# Patient Record
Sex: FEMALE | Race: BLACK OR AFRICAN AMERICAN | NOT HISPANIC OR LATINO
[De-identification: names, ages, dates, MRNs, and addresses within clinical notes are randomized per-mention and may not be internally consistent; named-entity substitution may affect disease eponyms.]

---

## 2022-02-07 ENCOUNTER — APPOINTMENT (OUTPATIENT)
Dept: HEART AND VASCULAR | Facility: CLINIC | Age: 52
End: 2022-02-07
Payer: COMMERCIAL

## 2022-02-07 ENCOUNTER — NON-APPOINTMENT (OUTPATIENT)
Age: 52
End: 2022-02-07

## 2022-02-07 VITALS
OXYGEN SATURATION: 96 % | TEMPERATURE: 97.7 F | WEIGHT: 186 LBS | SYSTOLIC BLOOD PRESSURE: 122 MMHG | HEART RATE: 101 BPM | HEIGHT: 62 IN | DIASTOLIC BLOOD PRESSURE: 85 MMHG | BODY MASS INDEX: 34.23 KG/M2

## 2022-02-07 VITALS — SYSTOLIC BLOOD PRESSURE: 121 MMHG | DIASTOLIC BLOOD PRESSURE: 84 MMHG

## 2022-02-07 DIAGNOSIS — Z78.9 OTHER SPECIFIED HEALTH STATUS: ICD-10-CM

## 2022-02-07 PROBLEM — Z00.00 ENCOUNTER FOR PREVENTIVE HEALTH EXAMINATION: Status: ACTIVE | Noted: 2022-02-07

## 2022-02-07 PROCEDURE — 36415 COLL VENOUS BLD VENIPUNCTURE: CPT

## 2022-02-07 PROCEDURE — 99205 OFFICE O/P NEW HI 60 MIN: CPT | Mod: 25

## 2022-02-07 PROCEDURE — 93000 ELECTROCARDIOGRAM COMPLETE: CPT

## 2022-02-07 NOTE — DISCUSSION/SUMMARY
[Patient] : the patient [___ Month(s)] : in [unfilled] month(s) [FreeTextEntry1] : \par 50 y/o female with h/o obesity, hl who presents for initial evaluation today.\par \par -ordered ekg today - ST, normal intervals, no st/t changes\par -ordered calcium score\par -ordered labs today\par -counseled on cvd risk factors\par -f/up 3 months for lipids, cvd risk factors\par \par I have spent 60 minutes reviewing labs, records, tests and discussing cvd risk factors, lipids

## 2022-02-07 NOTE — HISTORY OF PRESENT ILLNESS
[FreeTextEntry1] : \par 52 y/o female with h/o obesity, hl who presents for initial evaluation today.\par \par \par no cp, sob, palpitations, syncope, lh, edema, orthopnea, pnd\par notes some shoulder pain sharp seconds\par \par not actively exercising\par \par \par \par PMH/PSH:\par s/p appy \par hl\par foot surgery\par obesity\par \par ALL:\par nkda\par \par \par MEDS:\par mvi\par \par \par SH:\par no tobacco/drugs\par social etoh\par loan \par from [place]\par engaged\par no children\par \par \par \par FH:\par father - h/o cva,  72\par mother -  cervical cancer 38\par 2 brothers - alive

## 2022-02-09 LAB
ALBUMIN SERPL ELPH-MCNC: 4.5 G/DL
ALP BLD-CCNC: 91 U/L
ALT SERPL-CCNC: 17 U/L
ANION GAP SERPL CALC-SCNC: 12 MMOL/L
APPEARANCE: CLEAR
AST SERPL-CCNC: 20 U/L
BACTERIA: NEGATIVE
BASOPHILS # BLD AUTO: 0.03 K/UL
BASOPHILS NFR BLD AUTO: 0.5 %
BILIRUB SERPL-MCNC: 0.6 MG/DL
BILIRUBIN URINE: NEGATIVE
BLOOD URINE: NEGATIVE
BUN SERPL-MCNC: 19 MG/DL
CALCIUM SERPL-MCNC: 9.7 MG/DL
CHLORIDE SERPL-SCNC: 106 MMOL/L
CHOLEST SERPL-MCNC: 222 MG/DL
CO2 SERPL-SCNC: 24 MMOL/L
COLOR: YELLOW
CREAT SERPL-MCNC: 0.76 MG/DL
EOSINOPHIL # BLD AUTO: 0.02 K/UL
EOSINOPHIL NFR BLD AUTO: 0.3 %
ESTIMATED AVERAGE GLUCOSE: 117 MG/DL
GLUCOSE QUALITATIVE U: NEGATIVE
GLUCOSE SERPL-MCNC: 81 MG/DL
HBA1C MFR BLD HPLC: 5.7 %
HCT VFR BLD CALC: 39.6 %
HDLC SERPL-MCNC: 81 MG/DL
HGB BLD-MCNC: 12.3 G/DL
HYALINE CASTS: 1 /LPF
IMM GRANULOCYTES NFR BLD AUTO: 0.3 %
KETONES URINE: NEGATIVE
LDLC SERPL CALC-MCNC: 129 MG/DL
LEUKOCYTE ESTERASE URINE: NEGATIVE
LYMPHOCYTES # BLD AUTO: 1.56 K/UL
LYMPHOCYTES NFR BLD AUTO: 25.2 %
MAGNESIUM SERPL-MCNC: 1.9 MG/DL
MAN DIFF?: NORMAL
MCHC RBC-ENTMCNC: 27 PG
MCHC RBC-ENTMCNC: 31.1 GM/DL
MCV RBC AUTO: 86.8 FL
MICROSCOPIC-UA: NORMAL
MONOCYTES # BLD AUTO: 0.51 K/UL
MONOCYTES NFR BLD AUTO: 8.2 %
NEUTROPHILS # BLD AUTO: 4.05 K/UL
NEUTROPHILS NFR BLD AUTO: 65.5 %
NITRITE URINE: NEGATIVE
NONHDLC SERPL-MCNC: 141 MG/DL
PH URINE: 5.5
PLATELET # BLD AUTO: 253 K/UL
POTASSIUM SERPL-SCNC: 4.3 MMOL/L
PROT SERPL-MCNC: 7 G/DL
PROTEIN URINE: NORMAL
RBC # BLD: 4.56 M/UL
RBC # FLD: 14 %
RED BLOOD CELLS URINE: 1 /HPF
SODIUM SERPL-SCNC: 142 MMOL/L
SPECIFIC GRAVITY URINE: 1.03
SQUAMOUS EPITHELIAL CELLS: 2 /HPF
TRIGL SERPL-MCNC: 58 MG/DL
TSH SERPL-ACNC: 0.57 UIU/ML
UROBILINOGEN URINE: NORMAL
WBC # FLD AUTO: 6.19 K/UL
WHITE BLOOD CELLS URINE: 2 /HPF

## 2022-02-22 ENCOUNTER — RESULT REVIEW (OUTPATIENT)
Age: 52
End: 2022-02-22

## 2022-02-22 ENCOUNTER — OUTPATIENT (OUTPATIENT)
Dept: OUTPATIENT SERVICES | Facility: HOSPITAL | Age: 52
LOS: 1 days | End: 2022-02-22

## 2022-02-22 ENCOUNTER — APPOINTMENT (OUTPATIENT)
Dept: CT IMAGING | Facility: CLINIC | Age: 52
End: 2022-02-22
Payer: SELF-PAY

## 2022-02-22 PROCEDURE — 75571 CT HRT W/O DYE W/CA TEST: CPT | Mod: 26

## 2022-03-28 ENCOUNTER — APPOINTMENT (OUTPATIENT)
Dept: HEART AND VASCULAR | Facility: CLINIC | Age: 52
End: 2022-03-28
Payer: COMMERCIAL

## 2022-03-28 VITALS
HEART RATE: 82 BPM | WEIGHT: 186 LBS | SYSTOLIC BLOOD PRESSURE: 122 MMHG | HEIGHT: 62 IN | OXYGEN SATURATION: 97 % | TEMPERATURE: 96.2 F | DIASTOLIC BLOOD PRESSURE: 86 MMHG | BODY MASS INDEX: 34.23 KG/M2

## 2022-03-28 VITALS — DIASTOLIC BLOOD PRESSURE: 62 MMHG | SYSTOLIC BLOOD PRESSURE: 116 MMHG

## 2022-03-28 DIAGNOSIS — R73.03 PREDIABETES.: ICD-10-CM

## 2022-03-28 DIAGNOSIS — R07.9 CHEST PAIN, UNSPECIFIED: ICD-10-CM

## 2022-03-28 PROCEDURE — 99214 OFFICE O/P EST MOD 30 MIN: CPT | Mod: 25

## 2022-03-28 PROCEDURE — 93000 ELECTROCARDIOGRAM COMPLETE: CPT

## 2022-03-28 NOTE — HISTORY OF PRESENT ILLNESS
[FreeTextEntry1] : 53 y/o female with h/o obesity, hl, predm, pad who presents for f/up today\par \par Last visit \par notes some new cp, left arm pain/shoulder several times last week\par \par \par Calcium score -  - zero, atheromatous disease of aorta\par \par no sob, palpitations, syncope, lh, edema, orthopnea, pnd\par \par \par not actively exercising\par \par \par \par PMH/PSH:\par s/p appy \par hl\par foot surgery\par obesity\par predm\par pad\par \par ALL:\par nkda\par \par \par MEDS:\par mvi\par \par \par SH:\par no tobacco/drugs\par social etoh\par loan \par from [place]\par engaged\par no children\par \par \par \par FH:\par father - h/o cva,  72\par mother -  cervical cancer 38\par 2 brothers - alive \par

## 2022-03-28 NOTE — DISCUSSION/SUMMARY
[Patient] : the patient [___ Month(s)] : in [unfilled] month(s) [FreeTextEntry1] : 51 y/o female with h/o obesity, hl, predm, pad who presents for f/up today\par \par -ekg ordered today - nsr, normal intervals, no st/t changes\par -ekg 2/22 - ST, normal intervals, no st/t changes\par -calcium score 2022 - zero, atheromatous disease of aorta\par -labs 2022 reviewed\par -ordered CTA for cp, left shoulder pain\par -ordered Echo for cp, shoulder pain\par -consider asa, statin given pad and pending CTA - will discuss\par -close monitoring of A1c for predm\par -counseled on cvd risk factors\par -f/up 2-3 months for cvd risk factors, cp evaluation\par \par I have spent 30 minutes reviewing labs, records, tests and discussing cvd risk factors, cp evaluation

## 2022-04-12 ENCOUNTER — APPOINTMENT (OUTPATIENT)
Dept: CT IMAGING | Facility: CLINIC | Age: 52
End: 2022-04-12
Payer: COMMERCIAL

## 2022-04-12 ENCOUNTER — OUTPATIENT (OUTPATIENT)
Dept: OUTPATIENT SERVICES | Facility: HOSPITAL | Age: 52
LOS: 1 days | End: 2022-04-12

## 2022-04-12 ENCOUNTER — RESULT REVIEW (OUTPATIENT)
Age: 52
End: 2022-04-12

## 2022-04-12 PROCEDURE — 75574 CT ANGIO HRT W/3D IMAGE: CPT | Mod: 26

## 2022-05-06 ENCOUNTER — APPOINTMENT (OUTPATIENT)
Dept: HEART AND VASCULAR | Facility: CLINIC | Age: 52
End: 2022-05-06
Payer: COMMERCIAL

## 2022-05-06 VITALS
OXYGEN SATURATION: 97 % | DIASTOLIC BLOOD PRESSURE: 60 MMHG | BODY MASS INDEX: 32.57 KG/M2 | HEART RATE: 79 BPM | TEMPERATURE: 97.8 F | HEIGHT: 62 IN | WEIGHT: 177 LBS | SYSTOLIC BLOOD PRESSURE: 100 MMHG

## 2022-05-06 PROCEDURE — 93306 TTE W/DOPPLER COMPLETE: CPT

## 2022-06-10 ENCOUNTER — TRANSCRIPTION ENCOUNTER (OUTPATIENT)
Age: 52
End: 2022-06-10

## 2022-06-10 VITALS
OXYGEN SATURATION: 97 % | DIASTOLIC BLOOD PRESSURE: 66 MMHG | TEMPERATURE: 98 F | RESPIRATION RATE: 18 BRPM | WEIGHT: 179.9 LBS | HEIGHT: 62 IN | HEART RATE: 67 BPM | SYSTOLIC BLOOD PRESSURE: 102 MMHG

## 2022-06-10 RX ORDER — CHOLECALCIFEROL (VITAMIN D3) 125 MCG
1 CAPSULE ORAL
Qty: 0 | Refills: 0 | DISCHARGE

## 2022-06-10 NOTE — ASU PATIENT PROFILE, ADULT - NSICDXPASTSURGICALHX_GEN_ALL_CORE_FT
PAST SURGICAL HISTORY:  History of appendectomy      PAST SURGICAL HISTORY:  History of appendectomy     History of bunionectomy

## 2022-06-11 ENCOUNTER — OUTPATIENT (OUTPATIENT)
Dept: OUTPATIENT SERVICES | Facility: HOSPITAL | Age: 52
LOS: 1 days | Discharge: HOME | End: 2022-06-11
Payer: COMMERCIAL

## 2022-06-11 ENCOUNTER — RESULT REVIEW (OUTPATIENT)
Age: 52
End: 2022-06-11

## 2022-06-11 ENCOUNTER — TRANSCRIPTION ENCOUNTER (OUTPATIENT)
Age: 52
End: 2022-06-11

## 2022-06-11 VITALS
SYSTOLIC BLOOD PRESSURE: 102 MMHG | RESPIRATION RATE: 18 BRPM | OXYGEN SATURATION: 98 % | TEMPERATURE: 98 F | HEART RATE: 61 BPM | DIASTOLIC BLOOD PRESSURE: 76 MMHG

## 2022-06-11 DIAGNOSIS — Z98.890 OTHER SPECIFIED POSTPROCEDURAL STATES: Chronic | ICD-10-CM

## 2022-06-11 DIAGNOSIS — Z90.49 ACQUIRED ABSENCE OF OTHER SPECIFIED PARTS OF DIGESTIVE TRACT: Chronic | ICD-10-CM

## 2022-06-11 LAB
BLD GP AB SCN SERPL QL: NEGATIVE — SIGNIFICANT CHANGE UP
RH IG SCN BLD-IMP: POSITIVE — SIGNIFICANT CHANGE UP

## 2022-06-11 PROCEDURE — 86900 BLOOD TYPING SEROLOGIC ABO: CPT

## 2022-06-11 PROCEDURE — 88305 TISSUE EXAM BY PATHOLOGIST: CPT | Mod: 26

## 2022-06-11 PROCEDURE — 58558 HYSTEROSCOPY BIOPSY: CPT

## 2022-06-11 PROCEDURE — 86850 RBC ANTIBODY SCREEN: CPT

## 2022-06-11 PROCEDURE — 88305 TISSUE EXAM BY PATHOLOGIST: CPT

## 2022-06-11 PROCEDURE — 86901 BLOOD TYPING SEROLOGIC RH(D): CPT

## 2022-06-11 DEVICE — MYOSURE TISSUE REMOVAL DEVICE XL: Type: IMPLANTABLE DEVICE | Status: FUNCTIONAL

## 2022-06-11 DEVICE — MYOSURE TISSUE REMOVAL DEVICE REACH: Type: IMPLANTABLE DEVICE | Status: FUNCTIONAL

## 2022-06-11 RX ORDER — ACETAMINOPHEN 500 MG
1000 TABLET ORAL EVERY 6 HOURS
Refills: 0 | Status: DISCONTINUED | OUTPATIENT
Start: 2022-06-11 | End: 2022-06-11

## 2022-06-11 RX ORDER — ONDANSETRON 8 MG/1
8 TABLET, FILM COATED ORAL EVERY 8 HOURS
Refills: 0 | Status: DISCONTINUED | OUTPATIENT
Start: 2022-06-11 | End: 2022-06-11

## 2022-06-11 RX ORDER — ACETAMINOPHEN 500 MG
2 TABLET ORAL
Qty: 0 | Refills: 0 | DISCHARGE
Start: 2022-06-11

## 2022-06-11 RX ORDER — SODIUM CHLORIDE 9 MG/ML
1000 INJECTION, SOLUTION INTRAVENOUS
Refills: 0 | Status: DISCONTINUED | OUTPATIENT
Start: 2022-06-11 | End: 2022-06-11

## 2022-06-11 RX ORDER — SIMETHICONE 80 MG/1
80 TABLET, CHEWABLE ORAL EVERY 6 HOURS
Refills: 0 | Status: DISCONTINUED | OUTPATIENT
Start: 2022-06-11 | End: 2022-06-11

## 2022-06-11 NOTE — ASU DISCHARGE PLAN (ADULT/PEDIATRIC) - NS MD DC FALL RISK RISK
For information on Fall & Injury Prevention, visit: [email or web address] OR  [email or web address] OR  https://www.cdc.gov/steadi/patient.html

## 2022-06-11 NOTE — ASU DISCHARGE PLAN (ADULT/PEDIATRIC) - WORK/SCHOOL DURATION DAY(S)
Hpi Title: Evaluation of Skin Lesions
How Severe Are Your Spot(S)?: mild
Have Your Spot(S) Been Treated In The Past?: has not been treated
Location: Left superior cheek
2 bdays

## 2022-06-11 NOTE — ASU DISCHARGE PLAN (ADULT/PEDIATRIC) - CARE PROVIDER_API CALL
Iva Raymond)  Obstetrics and Gynecology  [address], [address]  [place], [place] [address]  Phone: [phone]  Fax: [phone]  Follow Up Time:

## 2022-06-11 NOTE — PACU DISCHARGE NOTE - COMMENTS
PACU and discharge criteria met. See flowsheet. Discharge instructions given to patient. Patient verbalized and demonstrated understanding.   D/C home at 1230 hrs. accompanied by Derik allan. Patient seen by Dr. Raymond in PACU before d/c home. Patient escorted to lobby via wheelchair by PCA.

## 2022-06-14 LAB — SURGICAL PATHOLOGY STUDY: SIGNIFICANT CHANGE UP

## 2022-11-21 ENCOUNTER — TRANSCRIPTION ENCOUNTER (OUTPATIENT)
Age: 52
End: 2022-11-21

## 2022-12-01 NOTE — ASU PATIENT PROFILE, ADULT - PAIN SCALE PREFERRED, PROFILE
12/1/2022 1:47 PM    Ms. Jailene Peña  [address] [address]        Hi Ms Chester Cheatham,    Advance Care Planning (ACP) is thinking about health care choices you may face in the future and helping others know ahead of time what your choices are. Planning is important for adults at any age or stage of health. The goal of ACP is to help ensure that you get medical care that is consistent with your values, goals and care preferences. Have you talked with your provider about advance care planning? Have you already placed your healthcare wishes in writing? Lets make sure your chart has your up-to-date care preferences! Ask yourself:   Who do I trust to make medical decisions for me if I cant make them myself?  If I had a sudden illness or accident, how much have I told my loved ones and doctors about how I would want to be cared for? Have I told them enough? While most people think advance care planning is important, they have a hard time knowing where to start. Your health care provider can help! Discuss ACP at your upcoming appointment. Also, you can update your information on your personalized health page in [address] at any time by clicking     [email or web address]]. Dont delay in taking this important step to discuss your choices with your health care provider. At [address], we believe your health care should always revolve around you. If you are still having issue with your error please reach out to [address] desk.       Sincerely,     Tammi FAJARDO, RN, [facility]   Care Transition Nurse  [phone]        Sincerely,      Wicho Zavala RN
12/1/2022 1:52 PM    [address] [address]          [facility],     Advance Care Planning (ACP) is thinking about health care choices you may face in the future and helping others know ahead of time what your choices are. Planning is important for adults at any age or stage of health. The goal of ACP is to help ensure that you get medical care that is consistent with your values, goals and care preferences. Have you talked with your provider about advance care planning? Have you already placed your healthcare wishes in writing? Lets make sure your chart has your up-to-date care preferences! Ask yourself:   Who do I trust to make medical decisions for me if I cant make them myself?  If I had a sudden illness or accident, how much have I told my loved ones and doctors about how I would want to be cared for? Have I told them enough? While most people think advance care planning is important, they have a hard time knowing where to start. Your health care provider can help! Discuss ACP at your upcoming appointment. Also, you can update your information on your personalized health page in [address] at any time by clicking     [email or web address]]. Dont delay in taking this important step to discuss your choices with your health care provider. At [facility], we believe your health care should always revolve around you. If you are still having issues with an error please reach out to [employer] help desk.     Sincerely,     Lonnie FAJARDO, RN, [facility]   Care Transition Nurse  [phone]          Sincerely,      David Browne, RN
numerical 0-10

## 2023-10-03 ENCOUNTER — APPOINTMENT (OUTPATIENT)
Dept: HEART AND VASCULAR | Facility: CLINIC | Age: 53
End: 2023-10-03
Payer: COMMERCIAL

## 2023-10-03 ENCOUNTER — NON-APPOINTMENT (OUTPATIENT)
Age: 53
End: 2023-10-03

## 2023-10-03 VITALS
SYSTOLIC BLOOD PRESSURE: 101 MMHG | HEIGHT: 62 IN | DIASTOLIC BLOOD PRESSURE: 73 MMHG | BODY MASS INDEX: 34.41 KG/M2 | OXYGEN SATURATION: 98 % | TEMPERATURE: 97.4 F | HEART RATE: 70 BPM | WEIGHT: 187 LBS

## 2023-10-03 PROBLEM — N84.0 POLYP OF CORPUS UTERI: Chronic | Status: ACTIVE | Noted: 2022-06-10

## 2023-10-03 PROCEDURE — 93000 ELECTROCARDIOGRAM COMPLETE: CPT

## 2023-10-03 PROCEDURE — 36415 COLL VENOUS BLD VENIPUNCTURE: CPT

## 2023-10-03 PROCEDURE — 99214 OFFICE O/P EST MOD 30 MIN: CPT | Mod: 25

## 2023-10-04 LAB
ALBUMIN SERPL ELPH-MCNC: 4.4 G/DL
ALP BLD-CCNC: 88 U/L
ALT SERPL-CCNC: 29 U/L
ANION GAP SERPL CALC-SCNC: 13 MMOL/L
APPEARANCE: CLEAR
AST SERPL-CCNC: 26 U/L
BACTERIA: NEGATIVE /HPF
BASOPHILS # BLD AUTO: 0.02 K/UL
BASOPHILS NFR BLD AUTO: 0.4 %
BILIRUB SERPL-MCNC: 0.4 MG/DL
BILIRUBIN URINE: NEGATIVE
BLOOD URINE: NEGATIVE
BUN SERPL-MCNC: 16 MG/DL
CALCIUM SERPL-MCNC: 9.7 MG/DL
CAST: 0 /LPF
CHLORIDE SERPL-SCNC: 103 MMOL/L
CHOLEST SERPL-MCNC: 213 MG/DL
CO2 SERPL-SCNC: 24 MMOL/L
COLOR: YELLOW
CREAT SERPL-MCNC: 0.65 MG/DL
EGFR: 105 ML/MIN/1.73M2
EOSINOPHIL # BLD AUTO: 0.04 K/UL
EOSINOPHIL NFR BLD AUTO: 0.8 %
EPITHELIAL CELLS: 11 /HPF
ESTIMATED AVERAGE GLUCOSE: 117 MG/DL
GLUCOSE QUALITATIVE U: NEGATIVE MG/DL
GLUCOSE SERPL-MCNC: 79 MG/DL
HBA1C MFR BLD HPLC: 5.7 %
HCT VFR BLD CALC: 39.3 %
HDLC SERPL-MCNC: 69 MG/DL
HGB BLD-MCNC: 12.1 G/DL
IMM GRANULOCYTES NFR BLD AUTO: 0.2 %
KETONES URINE: NEGATIVE MG/DL
LDLC SERPL CALC-MCNC: 129 MG/DL
LEUKOCYTE ESTERASE URINE: NEGATIVE
LYMPHOCYTES # BLD AUTO: 1.61 K/UL
LYMPHOCYTES NFR BLD AUTO: 30.7 %
MAGNESIUM SERPL-MCNC: 1.8 MG/DL
MAN DIFF?: NORMAL
MCHC RBC-ENTMCNC: 26.9 PG
MCHC RBC-ENTMCNC: 30.8 GM/DL
MCV RBC AUTO: 87.5 FL
MICROSCOPIC-UA: NORMAL
MONOCYTES # BLD AUTO: 0.48 K/UL
MONOCYTES NFR BLD AUTO: 9.1 %
NEUTROPHILS # BLD AUTO: 3.09 K/UL
NEUTROPHILS NFR BLD AUTO: 58.8 %
NITRITE URINE: NEGATIVE
NONHDLC SERPL-MCNC: 145 MG/DL
PH URINE: 6
PLATELET # BLD AUTO: 230 K/UL
POTASSIUM SERPL-SCNC: 4.4 MMOL/L
PROT SERPL-MCNC: 6.7 G/DL
PROTEIN URINE: NEGATIVE MG/DL
RBC # BLD: 4.49 M/UL
RBC # FLD: 14.6 %
RED BLOOD CELLS URINE: 1 /HPF
SODIUM SERPL-SCNC: 139 MMOL/L
SPECIFIC GRAVITY URINE: 1.02
TRIGL SERPL-MCNC: 89 MG/DL
TSH SERPL-ACNC: 0.46 UIU/ML
UROBILINOGEN URINE: 0.2 MG/DL
WBC # FLD AUTO: 5.25 K/UL
WHITE BLOOD CELLS URINE: 0 /HPF

## 2023-10-06 LAB — APO LP(A) SERPL-MCNC: 57.8 NMOL/L

## 2023-12-20 ENCOUNTER — OUTPATIENT (OUTPATIENT)
Dept: OUTPATIENT SERVICES | Facility: HOSPITAL | Age: 53
LOS: 1 days | End: 2023-12-20
Payer: COMMERCIAL

## 2023-12-20 DIAGNOSIS — Z98.890 OTHER SPECIFIED POSTPROCEDURAL STATES: Chronic | ICD-10-CM

## 2023-12-20 DIAGNOSIS — R07.9 CHEST PAIN, UNSPECIFIED: ICD-10-CM

## 2023-12-20 DIAGNOSIS — Z90.49 ACQUIRED ABSENCE OF OTHER SPECIFIED PARTS OF DIGESTIVE TRACT: Chronic | ICD-10-CM

## 2023-12-20 PROCEDURE — 93306 TTE W/DOPPLER COMPLETE: CPT | Mod: 26

## 2023-12-20 PROCEDURE — 93306 TTE W/DOPPLER COMPLETE: CPT

## 2023-12-20 PROCEDURE — 76376 3D RENDER W/INTRP POSTPROCES: CPT | Mod: 26

## 2024-01-02 NOTE — ASU PREOP CHECKLIST - ISOLATION PRECAUTIONS
- continue statin   - lipid panel pending      Discharge Instructions for:  Flex Sig / Colonoscopy  Activity  Due to the medication you received during your Flexible Sigmoidoscopy or Colonoscopy, do not drive a motor vehicle, operate machinery or appliances, make important decisions, sign legal documents, or drink alcohol for 24 hours.    Because of the medication you received, you may or may not remember your procedure or the doctor’s explanation of what your examination showed.    What to Expect  You may feel abdominal fullness and cramping.  This is normal and is caused by air used during the procedure to inflate the colon. This can be relieved with walking around, lying on your left side with knees bent and passing gas.  Do not use laxatives or enemas for one week following your procedure.        - If you were taking any previously you may resume them when you feel necessary; however, you may not need to due to the prep for the colonoscopy.  You may notice a small amount of blood on your toilet paper, a little red is okay and can discolor the toilet bowl to a pink/light-reddish color.  If you have more than 1 or 2 times of chunks or clots of blood in the toilet, call the office.  It may take a few days for you to return to your normal bowel habits.    Diet  Resume your regular diet. Follow the high fiber diet instructions. If you are  nauseated or have abdominal pain:  continue with clear liquids and progress slowly to your regular diet.    Managing nausea     Some people have an upset stomach after procedures. This is often because of anesthesia, pain, or pain medicine, or the stress of surgery. These tips will help you handle nausea and eat healthy foods as you get better. If you were on a special food plan before surgery, ask your doctor if you should follow it while you get better. These tips may help:  Do not push yourself to eat. Your body will tell you when to eat and how much.  Start off with clear liquids and soup. They are easier to digest.  Next  try semi-solid foods, such as mashed potatoes, applesauce, and gelatin, as you feel ready.  Slowly move to solid foods. Don’t eat fatty, rich, or spicy foods at first.  Do not force yourself to have 3 large meals a day. Instead eat smaller amounts more often.  Take pain medicines with a small amount of solid food, such as crackers or toast, to avoid nausea.  Special Instructions     During your procedure you had:    [] Biopsy(ies) taken.    [] Polyp(s) removed.    [x] No tissue removed. Follow up as needed.     If you had specimens removed, they have been sent to Pathology. The specimen results will be:     [] Called to you within the next week.    [] Discussed with you at your post op visit.     Call your doctor if any of the following problems occur.  Dr. Roberson's office number is: [phone]:   Severe pain in abdomen.  Ongoing or worsening chest pain or shortness of breath  Excessive nausea and/or vomiting  Abdominal bloating not relieved by passing gas or inability to pass gas  New or increased bleeding from rectum.  Black tarry stools  Temperature of 101.?F  or above     You can greatly reduce your risk of colon polyps and colorectal cancer by:           1. Having regular screenings.          2. Eating fruits, vegetables, and whole grains.          3. Reducing your fat intake.           4. Limiting your alcohol consumption.           5. Not using tobacco.            6. Staying physically active and maintaining a healthy body weight.           7. Reducing red meat consumption.          HIGH FIBER DIET     THE HIGH FIBER DIET   Dietary fiber is the part of a plant that cannot be digested by the body. Just as there are many   types of plants, there are also many types of fiber. Some fibers, such as oat bran, are soluble in water   and form a gelatinous bulk that can lower cholesterol. Other fibers, such as wheat bran, are insoluble   and add bulk to the stool. Both are important and provide benefits.     THE  FUNCTION OF THE LARGE INTESTINE   The principal function of the large intestine (colon) is to remove excess water from food wastes   passing into it from the small intestine. When food passes through the large intestine too quickly, not   enough water is absorbed by the intestine, and diarrhea results. In contrast, if waste material is passed   too slowly, too much water is absorbed. This results in hard stools and constipation, often adding to   straining.     THE IMPORTANCE OF DIETARY FIBER  Fiber, also called roughage or bulk, is necessary to promote the wavelike contractions that move   food through the intestine. High fiber foods expand the inside walls of the colon, easing the passage of   waste. As fiber passes through the intestine undigested, it absorbs large amounts of water, resulting in   softer and bulkier stools.   Rural [place] digest and eliminate the foods they eat in one-third the time it takes people who   live in the Western cultures. The rural  diet is rich in fiber. These speeds up the time required to   digest food and expel wastes. It is believed this helps sweep out harmful substances before they can   cause problems in the body. In fact, these rural people suffer less from many of the digestive tract   diseases that plague Western man and it is thought that this may be related to the nature of their diet.   A high-fiber diet causes a large, soft, bulky stool that passes through the bowel more easily and   quickly. This helps to prevent, stop, or even reverse some digestive tract disorders. A softer, larger stool   helps prevent constipation and straining, which can help avoid or relieve hemorrhoids. More bulk means   less pressure in the colon, and this is important in treating irritable bowel syndrome and diverticulosis.   Most Americans eat only 10 to 15 grams of fiber a day. The recommended intake is 20 to 35   grams a day. High fiber foods, such as fruits and vegetables, also  none tend to be low in calories, so they   should not cause weight gain. Fiber pills generally should be avoided. They contain relatively little fiber   and are expensive. Fiber-containing foods and powdered fiber supplements are better sources.     HIGH FIBER DIET IN THE IRRITABLE BOWEL SYNDROME  Irritable bowel syndrome, sometimes called spastic colon or IBS, is one of the most common   disorders of the lower digestive tract. There is no disease present in irritable bowel syndrome. However,   its symptoms can resemble other disorders. The symptoms of IBS are constipation, diarrhea (or both   alternately), abdominal pain, cramping and spasms. Acute episodes can be triggered by emotional   tension and anxiety, poor dietary habits, and certain medications. Increased amounts of fiber in the diet   can help relieve symptoms of irritable bowel syndrome by producing soft, bulky stools and helping to   normalize the time the stool takes to pass through the colon. The increased bulk also reduces the   pressure necessary to push food waste through the colon, which results in less discomfort. If irritable   bowel syndrome is not treated, it may lead to diverticulosis of the colon.     HIGH FIBER DIET AND COLON POLYPS/CANCER   Colon cancer is a major health problem. It is second only to lung cancer in the number of deaths   per year. In countries where grains are unprocessed and retain their fiber, there is a lower incidence of   colon cancer. Most colon cancer begins as a colon polyp, a benign mushroom-shaped growth that in   time grows and, in some people, becomes a cancer. Colon cancer is preventable if polyps are removed   at an early stage. It is now known that the tendency to develop colon cancer may be inherited. But there   may be other factors involved as well. One theory is that cancer-causing chemicals (carcinogens) in the   diet and environment can stay in contact with the colon wall a longer time and in higher  concentrations   when the diet is low in fiber. A large, bulky stool acts to dilute and diffuse these carcinogens and to move   them through the bowel quickly. Less carcinogen exposure to the colon may mean less colon polyps and   cancer.     HIGH FIBER DIET AND DIVERTICULOSIS   Colon diverticulosis occurs when pockets or sacks bulge out from the bowel wall. It is known that   these diverticula occur gradually over time and are due to excessive pressure or spasms within the   bowel. These pockets usually cause no problem, but sometimes they can become infected (diverticulitis)   or even break open, causing abscess or peritonitis. A high-fiber diet increases the bulk in the stool, which   reduces pressure within the colon. By so doing, divericula formation may be reduced or even stopped.     HIGH FIBER DIET AND CHOLESTEROL  As noted above, fiber generally is divided into two categories. Insoluble fiber is found in wheat   bran and in celluloses from vegetables and fruits. Soluble fiber is commonly found in oatmeal, oat bran   (the best source), guar gum, psyllium seed, fruit pectin and gum arabic. When mixed with water, it   produces a gelatinous mucous gel. It, too, has bowel-regulating effects. It also acts to lower blood   cholesterol by binding with the cholesterol in the intestine and carrying it away in the stool. So, a high   fiber diet should contain both types of fiber.     HIGH FIBER FOODS  High fiber foods can be found in most food groups. Different types of food should be selected to   get the benefits of them all.   1. Legumes---Including kidney, tolbert, navy, lima and baked beans. The bean family excels in   fiber, especially the soluble, cholesterol-lowering type.   2. Whole Grains---Wheat bran and oat bran are present in a variety of cereals and breads. The   label should say that the bread contains whole wheat or whole grain. Plain wheat bread may   lack the fiber. One cannot always tell by the color.  Some manufacturers artificially color   bread brown to make it look more wholesome.   3. Whole Fresh Fruits---The valuable pectin fiber is found in the skin and pulp. Figs, prunes   and raspberries have the highest fiber content.   4. Cooked or Stewed Fruits---Prunes and applesauce are good choices.   5. Green Leafy Vegetables---Lettuce, spinach, celery, and broccoli are good examples.   6. Root Vegetables---Potatoes, turnips and carrots are all excellent sources.   Since bran can cause rumbling intestinal gas and even some mild cramping, it should be   started in small amounts initially. The amount can be increased as tolerance is acquired. The   goal should be 20 to 35 grams of fiber a day, which will usually produce 1 to 2 soft, formed stools   a day.   The following are good general rules:   1. Drink plenty of liquids, including fruit or vegetable juices and water.   2. Eat slowly and chew food thoroughly to allow the upper digestive tract   (esophagus, stomach and small intestine) to work well. This may help prevent   problems from developing in the lower digestive tract.   3. Eat meals at regular intervals.     A DIETARY FIBER SUPPLEMENT MAY BE HELPFUL  Some people have trouble tolerating too many high fiber foods in the diet. Stool softening and   bulking agents are available over the counter. These products are usually plant fiber that absorbs water   and produces the bulk necessary for the digestive tract to perform naturally. Psyllium fiber is found in   many commercial products such as Metamucil, Per Betty and Konsyl. The regular product contains a fair   amount of sugar, so it may be preferable to use the sugar-free products. Most pharmacies carry a   generic brand at significant cost savings. Citrucel (hemicellulose) and Equilactin (polycarboxisal) are   other bulking agents that can be used. These fiber supplements, in conjunction with foods, offer an easy   way to reach the fiber goal of 20 to 35 grams  per day.

## 2024-04-02 ENCOUNTER — APPOINTMENT (OUTPATIENT)
Dept: HEART AND VASCULAR | Facility: CLINIC | Age: 54
End: 2024-04-02
Payer: COMMERCIAL

## 2024-04-02 VITALS
HEART RATE: 90 BPM | HEIGHT: 62 IN | BODY MASS INDEX: 34.04 KG/M2 | DIASTOLIC BLOOD PRESSURE: 79 MMHG | WEIGHT: 185 LBS | SYSTOLIC BLOOD PRESSURE: 111 MMHG | OXYGEN SATURATION: 98 % | TEMPERATURE: 97.6 F

## 2024-04-02 DIAGNOSIS — I49.3 VENTRICULAR PREMATURE DEPOLARIZATION: ICD-10-CM

## 2024-04-02 PROCEDURE — 93000 ELECTROCARDIOGRAM COMPLETE: CPT

## 2024-04-02 PROCEDURE — 36415 COLL VENOUS BLD VENIPUNCTURE: CPT

## 2024-04-02 PROCEDURE — 99214 OFFICE O/P EST MOD 30 MIN: CPT | Mod: 25

## 2024-04-02 NOTE — DISCUSSION/SUMMARY
[Patient] : the patient [___ Month(s)] : in [unfilled] month(s) [EKG obtained to assist in diagnosis and management of assessed problem(s)] : EKG obtained to assist in diagnosis and management of assessed problem(s) [FreeTextEntry1] : 55 y/o female with h/o obesity, hl, predm, pad who presents for f/up today  -Echo 12/23: normal lv size/fxn, ef 60%, mild mr, trace tr/pr  -advised daily statin, continue crestor  -start asa 81 mg qd  -ekg ordered today - nsr w pvc, normal intervals, no st/t changes  -holter ordered for pvc's  -ekg 2/22 - ST, normal intervals, no st/t changes  -calcium score 2022 - zero, atheromatous disease of aorta  -labs 2023 reviewed, lipids, A1c ordered   -CTA cor 4/22 IMPRESSION: Cardiac: 1. The calcium score is 0 Agatston units. 2. Normal coronary arteries. Non-cardiac: 1. Cardiomegaly.  -Echo 5/22: ef 60%, no wma, on some views rv appears borderline increased and rv fxn borderline reduced, trace mr/tr, mild pr, normal pap  -close monitoring of A1c for predm  -counseled on cvd risk factors  -refer to [place] for weight loss drugs  -f/up 2-3 months for cvd risk factors     I have spent 30 minutes reviewing labs, records, tests and discussing cvd risk factors, pvc's

## 2024-04-02 NOTE — HISTORY OF PRESENT ILLNESS
[FreeTextEntry1] : 53 y/o female with h/o obesity, hl, predm, pad who presents for f/up today    Last visit 10/23   no cp, sob, syncope, lh, edema, orthopnea, pnd, palpitations  CTA cor  IMPRESSION: Cardiac: 1. The calcium score is 0 Agatston units. 2. Normal coronary arteries. Non-cardiac: 1. Cardiomegaly.  Echo : ef 60%, no wma, on some views rv appears borderline increased and rv fxn borderline reduced, trace mr/tr, mild pr, normal pap  Calcium score -  - zero, atheromatous disease of aorta   not exercising        PMH/PSH:  s/p appy   hl  foot surgery  obesity  predm  pad  uterine polyp removal   ALL:  nkda      MEDS: crestor 5 mg qhs mvi vit D      SH:  no tobacco/drugs  social etoh  loan   from [place]  engaged  no children        FH:  father - h/o cva,  72  mother -  cervical cancer 38  2 brothers - alive

## 2024-04-03 LAB
ANION GAP SERPL CALC-SCNC: 12 MMOL/L
BUN SERPL-MCNC: 16 MG/DL
CALCIUM SERPL-MCNC: 9.6 MG/DL
CHLORIDE SERPL-SCNC: 103 MMOL/L
CHOLEST SERPL-MCNC: 191 MG/DL
CO2 SERPL-SCNC: 24 MMOL/L
CREAT SERPL-MCNC: 0.75 MG/DL
EGFR: 94 ML/MIN/1.73M2
ESTIMATED AVERAGE GLUCOSE: 114 MG/DL
GLUCOSE SERPL-MCNC: 81 MG/DL
HBA1C MFR BLD HPLC: 5.6 %
HDLC SERPL-MCNC: 87 MG/DL
LDLC SERPL CALC-MCNC: 93 MG/DL
MAGNESIUM SERPL-MCNC: 2 MG/DL
NONHDLC SERPL-MCNC: 104 MG/DL
POTASSIUM SERPL-SCNC: 4.2 MMOL/L
SODIUM SERPL-SCNC: 139 MMOL/L
TRIGL SERPL-MCNC: 61 MG/DL
TSH SERPL-ACNC: 0.74 UIU/ML

## 2024-04-10 ENCOUNTER — APPOINTMENT (OUTPATIENT)
Dept: HEART AND VASCULAR | Facility: CLINIC | Age: 54
End: 2024-04-10

## 2024-04-16 ENCOUNTER — RX RENEWAL (OUTPATIENT)
Age: 54
End: 2024-04-16

## 2024-04-16 ENCOUNTER — NON-APPOINTMENT (OUTPATIENT)
Age: 54
End: 2024-04-16

## 2024-04-16 RX ORDER — ROSUVASTATIN CALCIUM 5 MG/1
5 TABLET, FILM COATED ORAL
Qty: 90 | Refills: 0 | Status: ACTIVE | COMMUNITY
Start: 2023-10-04 | End: 1900-01-01

## 2024-05-08 ENCOUNTER — APPOINTMENT (OUTPATIENT)
Dept: HEART AND VASCULAR | Facility: CLINIC | Age: 54
End: 2024-05-08
Payer: COMMERCIAL

## 2024-05-08 VITALS
BODY MASS INDEX: 34.41 KG/M2 | DIASTOLIC BLOOD PRESSURE: 78 MMHG | TEMPERATURE: 97.3 F | HEIGHT: 62 IN | HEART RATE: 113 BPM | OXYGEN SATURATION: 96 % | SYSTOLIC BLOOD PRESSURE: 120 MMHG | WEIGHT: 187 LBS

## 2024-05-08 DIAGNOSIS — E66.9 OBESITY, UNSPECIFIED: ICD-10-CM

## 2024-05-08 DIAGNOSIS — E78.5 HYPERLIPIDEMIA, UNSPECIFIED: ICD-10-CM

## 2024-05-08 PROCEDURE — 99215 OFFICE O/P EST HI 40 MIN: CPT

## 2024-05-08 NOTE — DISCUSSION/SUMMARY
[FreeTextEntry1] : 53 y/o female with h/o obesity, hl, predm, pad who presents today as a referral from Dr. Wall for weight management and lifestyle counseling.  Dietary and exercise habits reviewed and discussed with over 50% of the visit spent discussing cardiovascular disease, the optimization of risk factors and lifestyle strategies that can be used to achieve this.  BMI 34 - The cardiometabolic benefits of GLP-1 agonists were discussed as well as the possible side effects, including injection site reaction, nausea, vomiting, diarrhea, constipation, gastroparesis, dehydration that could worsen kidney function, mood changes, worsening diabetic retinopathy, dizziness and headaches. Pancreatitis and kidney failure history were reviewed since caution should be taken in patients with a history of either condition. Encouraged adequate hydration, especially in the setting of reduced food volume while taking the drug. Patient denies a personal or family history of medullary thyroid cancer or MEN2. Advised that the risk of hypoglycemia with the drug class is rare, but does increase if combined with insulin or other oral diabetes medications. - will try for Zepbound as Wegovy continues to be unavailable regarding supply of lower starting doses  - Encouraged patient to continue healthy exercise and eating habits, focusing on a Mediterranean style of eating w/ more plant based foods in general, and aiming for the recommended 150 minutes per week of moderate physical activity.  Pt will continue to follow w/ Dr. Wall.

## 2024-05-08 NOTE — PHYSICAL EXAM
[Normal] : clear lung fields, good air entry, no respiratory distress [Normal Gait] : normal gait [No Edema] : no edema [Moves all extremities] : moves all extremities [Alert and Oriented] : alert and oriented

## 2024-05-08 NOTE — HISTORY OF PRESENT ILLNESS
[FreeTextEntry1] : 53 y/o female with h/o obesity, hl, predm, pad who presents today as a referral from Dr. Wall for weight management and lifestyle counseling.  Pt feels she has been struggling w/ weight more w/ menopause. Her last period was last summer.   She reports she goes back and forth w/ good lifestyle habits and then can fall off the wagon.  Her  is very fit and eats well, so he is supportive of her habits.   Patient denies any chest pain, SOB, palpitations, orthopnea, PND or LE edema.

## 2024-05-09 RX ORDER — TIRZEPATIDE 2.5 MG/.5ML
2.5 INJECTION, SOLUTION SUBCUTANEOUS
Qty: 1 | Refills: 5 | Status: ACTIVE | COMMUNITY
Start: 2024-05-08 | End: 1900-01-01

## 2024-07-24 ENCOUNTER — RX RENEWAL (OUTPATIENT)
Age: 54
End: 2024-07-24

## 2024-10-23 ENCOUNTER — APPOINTMENT (OUTPATIENT)
Dept: HEART AND VASCULAR | Facility: CLINIC | Age: 54
End: 2024-10-23
Payer: COMMERCIAL

## 2024-10-23 DIAGNOSIS — R73.03 PREDIABETES.: ICD-10-CM

## 2024-10-23 DIAGNOSIS — E66.9 OBESITY, UNSPECIFIED: ICD-10-CM

## 2024-10-23 PROCEDURE — 99213 OFFICE O/P EST LOW 20 MIN: CPT

## 2024-12-09 ENCOUNTER — NON-APPOINTMENT (OUTPATIENT)
Age: 54
End: 2024-12-09

## 2024-12-09 ENCOUNTER — RX RENEWAL (OUTPATIENT)
Age: 54
End: 2024-12-09

## 2024-12-10 PROBLEM — Z80.49 FAMILY HISTORY OF CERVICAL CANCER: Status: ACTIVE | Noted: 2024-12-10

## 2024-12-10 PROBLEM — Z82.3 FAMILY HISTORY OF STROKE: Status: ACTIVE | Noted: 2024-12-10

## 2024-12-11 ENCOUNTER — NON-APPOINTMENT (OUTPATIENT)
Age: 54
End: 2024-12-11

## 2024-12-11 ENCOUNTER — APPOINTMENT (OUTPATIENT)
Dept: INTERNAL MEDICINE | Facility: CLINIC | Age: 54
End: 2024-12-11
Payer: COMMERCIAL

## 2024-12-11 VITALS
HEIGHT: 62 IN | DIASTOLIC BLOOD PRESSURE: 77 MMHG | TEMPERATURE: 96.7 F | WEIGHT: 184 LBS | OXYGEN SATURATION: 100 % | BODY MASS INDEX: 33.86 KG/M2 | SYSTOLIC BLOOD PRESSURE: 118 MMHG | HEART RATE: 90 BPM

## 2024-12-11 DIAGNOSIS — E78.5 HYPERLIPIDEMIA, UNSPECIFIED: ICD-10-CM

## 2024-12-11 DIAGNOSIS — Z80.49 FAMILY HISTORY OF MALIGNANT NEOPLASM OF OTHER GENITAL ORGANS: ICD-10-CM

## 2024-12-11 DIAGNOSIS — Z87.898 PERSONAL HISTORY OF OTHER SPECIFIED CONDITIONS: ICD-10-CM

## 2024-12-11 DIAGNOSIS — Z82.3 FAMILY HISTORY OF STROKE: ICD-10-CM

## 2024-12-11 DIAGNOSIS — Z00.00 ENCOUNTER FOR GENERAL ADULT MEDICAL EXAMINATION W/OUT ABNORMAL FINDINGS: ICD-10-CM

## 2024-12-11 DIAGNOSIS — Z23 ENCOUNTER FOR IMMUNIZATION: ICD-10-CM

## 2024-12-11 DIAGNOSIS — E66.9 OBESITY, UNSPECIFIED: ICD-10-CM

## 2024-12-11 DIAGNOSIS — R73.03 PREDIABETES.: ICD-10-CM

## 2024-12-11 DIAGNOSIS — Z78.9 OTHER SPECIFIED HEALTH STATUS: ICD-10-CM

## 2024-12-11 DIAGNOSIS — Z12.11 ENCOUNTER FOR SCREENING FOR MALIGNANT NEOPLASM OF COLON: ICD-10-CM

## 2024-12-11 PROCEDURE — 90471 IMMUNIZATION ADMIN: CPT

## 2024-12-11 PROCEDURE — 99386 PREV VISIT NEW AGE 40-64: CPT | Mod: 25

## 2024-12-11 PROCEDURE — 36415 COLL VENOUS BLD VENIPUNCTURE: CPT

## 2024-12-11 PROCEDURE — 90715 TDAP VACCINE 7 YRS/> IM: CPT

## 2024-12-12 ENCOUNTER — TRANSCRIPTION ENCOUNTER (OUTPATIENT)
Age: 54
End: 2024-12-12

## 2024-12-12 LAB
ALBUMIN SERPL ELPH-MCNC: 4.1 G/DL
ALP BLD-CCNC: 98 U/L
ALT SERPL-CCNC: 18 U/L
ANION GAP SERPL CALC-SCNC: 13 MMOL/L
AST SERPL-CCNC: 21 U/L
BASOPHILS # BLD AUTO: 0.03 K/UL
BASOPHILS NFR BLD AUTO: 0.6 %
BILIRUB SERPL-MCNC: 0.8 MG/DL
BUN SERPL-MCNC: 19 MG/DL
CALCIUM SERPL-MCNC: 9.8 MG/DL
CHLORIDE SERPL-SCNC: 102 MMOL/L
CHOLEST SERPL-MCNC: 202 MG/DL
CO2 SERPL-SCNC: 27 MMOL/L
CREAT SERPL-MCNC: 0.94 MG/DL
EGFR: 72 ML/MIN/1.73M2
EOSINOPHIL # BLD AUTO: 0.08 K/UL
EOSINOPHIL NFR BLD AUTO: 1.5 %
ESTIMATED AVERAGE GLUCOSE: 103 MG/DL
GLUCOSE SERPL-MCNC: 85 MG/DL
HBA1C MFR BLD HPLC: 5.2 %
HCT VFR BLD CALC: 40.5 %
HDLC SERPL-MCNC: 79 MG/DL
HGB BLD-MCNC: 12.4 G/DL
IMM GRANULOCYTES NFR BLD AUTO: 0.2 %
LDLC SERPL CALC-MCNC: 106 MG/DL
LYMPHOCYTES # BLD AUTO: 1.59 K/UL
LYMPHOCYTES NFR BLD AUTO: 29.2 %
MAN DIFF?: NORMAL
MCHC RBC-ENTMCNC: 27.1 PG
MCHC RBC-ENTMCNC: 30.6 G/DL
MCV RBC AUTO: 88.6 FL
MONOCYTES # BLD AUTO: 0.48 K/UL
MONOCYTES NFR BLD AUTO: 8.8 %
NEUTROPHILS # BLD AUTO: 3.26 K/UL
NEUTROPHILS NFR BLD AUTO: 59.7 %
NONHDLC SERPL-MCNC: 123 MG/DL
PLATELET # BLD AUTO: 250 K/UL
POTASSIUM SERPL-SCNC: 4.9 MMOL/L
PROT SERPL-MCNC: 7 G/DL
RBC # BLD: 4.57 M/UL
RBC # FLD: 14.3 %
SODIUM SERPL-SCNC: 142 MMOL/L
TRIGL SERPL-MCNC: 94 MG/DL
WBC # FLD AUTO: 5.45 K/UL

## 2024-12-13 ENCOUNTER — TRANSCRIPTION ENCOUNTER (OUTPATIENT)
Age: 54
End: 2024-12-13

## 2024-12-24 PROBLEM — F10.90 ALCOHOL USE: Status: ACTIVE | Noted: 2024-12-11

## 2024-12-27 ENCOUNTER — APPOINTMENT (OUTPATIENT)
Dept: GASTROENTEROLOGY | Facility: CLINIC | Age: 54
End: 2024-12-27
Payer: COMMERCIAL

## 2024-12-27 VITALS
DIASTOLIC BLOOD PRESSURE: 90 MMHG | OXYGEN SATURATION: 98 % | SYSTOLIC BLOOD PRESSURE: 120 MMHG | RESPIRATION RATE: 14 BRPM | HEART RATE: 87 BPM | BODY MASS INDEX: 33.86 KG/M2 | HEIGHT: 62 IN | TEMPERATURE: 96.9 F | WEIGHT: 184 LBS

## 2024-12-27 DIAGNOSIS — Z12.11 ENCOUNTER FOR SCREENING FOR MALIGNANT NEOPLASM OF COLON: ICD-10-CM

## 2024-12-27 DIAGNOSIS — E66.9 OBESITY, UNSPECIFIED: ICD-10-CM

## 2024-12-27 DIAGNOSIS — K59.00 CONSTIPATION, UNSPECIFIED: ICD-10-CM

## 2024-12-27 PROCEDURE — 99204 OFFICE O/P NEW MOD 45 MIN: CPT

## 2025-01-14 ENCOUNTER — NON-APPOINTMENT (OUTPATIENT)
Age: 55
End: 2025-01-14

## 2025-02-13 ENCOUNTER — APPOINTMENT (OUTPATIENT)
Dept: INTERNAL MEDICINE | Facility: CLINIC | Age: 55
End: 2025-02-13
Payer: COMMERCIAL

## 2025-02-13 ENCOUNTER — NON-APPOINTMENT (OUTPATIENT)
Age: 55
End: 2025-02-13

## 2025-02-13 VITALS
WEIGHT: 180 LBS | SYSTOLIC BLOOD PRESSURE: 116 MMHG | OXYGEN SATURATION: 99 % | HEART RATE: 84 BPM | DIASTOLIC BLOOD PRESSURE: 81 MMHG | BODY MASS INDEX: 33.13 KG/M2 | TEMPERATURE: 97.1 F | HEIGHT: 62 IN

## 2025-02-13 DIAGNOSIS — R22.1 LOCALIZED SWELLING, MASS AND LUMP, NECK: ICD-10-CM

## 2025-02-13 DIAGNOSIS — E78.5 HYPERLIPIDEMIA, UNSPECIFIED: ICD-10-CM

## 2025-02-13 DIAGNOSIS — M79.644 PAIN IN RIGHT FINGER(S): ICD-10-CM

## 2025-02-13 DIAGNOSIS — E66.9 OBESITY, UNSPECIFIED: ICD-10-CM

## 2025-02-13 PROCEDURE — 99214 OFFICE O/P EST MOD 30 MIN: CPT

## 2025-02-13 PROCEDURE — G2211 COMPLEX E/M VISIT ADD ON: CPT | Mod: NC

## 2025-02-20 ENCOUNTER — APPOINTMENT (OUTPATIENT)
Dept: ULTRASOUND IMAGING | Facility: CLINIC | Age: 55
End: 2025-02-20
Payer: COMMERCIAL

## 2025-02-20 ENCOUNTER — OUTPATIENT (OUTPATIENT)
Dept: OUTPATIENT SERVICES | Facility: HOSPITAL | Age: 55
LOS: 1 days | End: 2025-02-20

## 2025-02-20 DIAGNOSIS — Z98.890 OTHER SPECIFIED POSTPROCEDURAL STATES: Chronic | ICD-10-CM

## 2025-02-20 DIAGNOSIS — Z90.49 ACQUIRED ABSENCE OF OTHER SPECIFIED PARTS OF DIGESTIVE TRACT: Chronic | ICD-10-CM

## 2025-02-20 PROCEDURE — 76536 US EXAM OF HEAD AND NECK: CPT | Mod: 26

## 2025-02-25 ENCOUNTER — TRANSCRIPTION ENCOUNTER (OUTPATIENT)
Age: 55
End: 2025-02-25

## 2025-03-12 ENCOUNTER — TRANSCRIPTION ENCOUNTER (OUTPATIENT)
Age: 55
End: 2025-03-12

## 2025-03-28 ENCOUNTER — TRANSCRIPTION ENCOUNTER (OUTPATIENT)
Age: 55
End: 2025-03-28

## 2025-04-01 ENCOUNTER — TRANSCRIPTION ENCOUNTER (OUTPATIENT)
Age: 55
End: 2025-04-01

## 2025-04-09 ENCOUNTER — APPOINTMENT (OUTPATIENT)
Dept: HEART AND VASCULAR | Facility: CLINIC | Age: 55
End: 2025-04-09
Payer: COMMERCIAL

## 2025-04-09 ENCOUNTER — NON-APPOINTMENT (OUTPATIENT)
Age: 55
End: 2025-04-09

## 2025-04-09 VITALS
OXYGEN SATURATION: 97 % | BODY MASS INDEX: 32.57 KG/M2 | HEART RATE: 86 BPM | HEIGHT: 62 IN | WEIGHT: 177 LBS | TEMPERATURE: 97.1 F | SYSTOLIC BLOOD PRESSURE: 124 MMHG | DIASTOLIC BLOOD PRESSURE: 84 MMHG

## 2025-04-09 VITALS — SYSTOLIC BLOOD PRESSURE: 110 MMHG | DIASTOLIC BLOOD PRESSURE: 70 MMHG

## 2025-04-09 PROCEDURE — 93000 ELECTROCARDIOGRAM COMPLETE: CPT

## 2025-04-09 PROCEDURE — 36415 COLL VENOUS BLD VENIPUNCTURE: CPT

## 2025-04-09 PROCEDURE — 99215 OFFICE O/P EST HI 40 MIN: CPT

## 2025-04-09 RX ORDER — ASPIRIN 81 MG/1
81 TABLET ORAL
Refills: 0 | Status: ACTIVE | COMMUNITY

## 2025-04-10 LAB
CHOLEST SERPL-MCNC: 181 MG/DL
HDLC SERPL-MCNC: 76 MG/DL
LDLC SERPL-MCNC: 94 MG/DL
NONHDLC SERPL-MCNC: 106 MG/DL
TRIGL SERPL-MCNC: 61 MG/DL

## 2025-04-22 ENCOUNTER — APPOINTMENT (OUTPATIENT)
Dept: OBGYN | Facility: CLINIC | Age: 55
End: 2025-04-22
Payer: COMMERCIAL

## 2025-04-22 VITALS — HEART RATE: 81 BPM | SYSTOLIC BLOOD PRESSURE: 109 MMHG | DIASTOLIC BLOOD PRESSURE: 76 MMHG | OXYGEN SATURATION: 98 %

## 2025-04-22 DIAGNOSIS — D25.1 INTRAMURAL LEIOMYOMA OF UTERUS: ICD-10-CM

## 2025-04-22 DIAGNOSIS — Z01.419 ENCOUNTER FOR GYNECOLOGICAL EXAMINATION (GENERAL) (ROUTINE) W/OUT ABNORMAL FINDINGS: ICD-10-CM

## 2025-04-22 DIAGNOSIS — Z87.42 PERSONAL HISTORY OF OTHER DISEASES OF THE FEMALE GENITAL TRACT: ICD-10-CM

## 2025-04-22 DIAGNOSIS — N95.1 MENOPAUSAL AND FEMALE CLIMACTERIC STATES: ICD-10-CM

## 2025-04-22 DIAGNOSIS — R87.810 CERVICAL HIGH RISK HUMAN PAPILLOMAVIRUS (HPV) DNA TEST POSITIVE: ICD-10-CM

## 2025-04-22 DIAGNOSIS — D25.9 LEIOMYOMA OF UTERUS, UNSPECIFIED: ICD-10-CM

## 2025-04-22 PROCEDURE — 99459 PELVIC EXAMINATION: CPT

## 2025-04-22 PROCEDURE — 99386 PREV VISIT NEW AGE 40-64: CPT

## 2025-04-23 LAB — HPV HIGH+LOW RISK DNA PNL CVX: NOT DETECTED

## 2025-04-24 ENCOUNTER — RESULT REVIEW (OUTPATIENT)
Age: 55
End: 2025-04-24

## 2025-04-28 ENCOUNTER — TRANSCRIPTION ENCOUNTER (OUTPATIENT)
Age: 55
End: 2025-04-28

## 2025-04-28 LAB — CYTOLOGY CVX/VAG DOC THIN PREP: NORMAL

## 2025-05-06 ENCOUNTER — NON-APPOINTMENT (OUTPATIENT)
Age: 55
End: 2025-05-06

## 2025-05-12 ENCOUNTER — TRANSCRIPTION ENCOUNTER (OUTPATIENT)
Age: 55
End: 2025-05-12

## 2025-05-28 ENCOUNTER — APPOINTMENT (OUTPATIENT)
Dept: INTERNAL MEDICINE | Facility: CLINIC | Age: 55
End: 2025-05-28

## 2025-07-15 ENCOUNTER — APPOINTMENT (OUTPATIENT)
Dept: HEART AND VASCULAR | Facility: CLINIC | Age: 55
End: 2025-07-15
Payer: COMMERCIAL

## 2025-07-15 VITALS — DIASTOLIC BLOOD PRESSURE: 73 MMHG | SYSTOLIC BLOOD PRESSURE: 107 MMHG

## 2025-07-15 VITALS
HEART RATE: 83 BPM | TEMPERATURE: 98.2 F | SYSTOLIC BLOOD PRESSURE: 116 MMHG | WEIGHT: 175 LBS | HEIGHT: 62 IN | OXYGEN SATURATION: 98 % | BODY MASS INDEX: 32.2 KG/M2 | DIASTOLIC BLOOD PRESSURE: 83 MMHG

## 2025-07-15 PROCEDURE — 99214 OFFICE O/P EST MOD 30 MIN: CPT

## 2025-07-15 PROCEDURE — 36415 COLL VENOUS BLD VENIPUNCTURE: CPT

## 2025-07-16 LAB
CHOLEST SERPL-MCNC: 169 MG/DL
HDLC SERPL-MCNC: 83 MG/DL
LDLC SERPL-MCNC: 74 MG/DL
NONHDLC SERPL-MCNC: 86 MG/DL
TRIGL SERPL-MCNC: 64 MG/DL

## 2025-07-18 ENCOUNTER — APPOINTMENT (OUTPATIENT)
Dept: HEART AND VASCULAR | Facility: CLINIC | Age: 55
End: 2025-07-18
Payer: COMMERCIAL

## 2025-07-18 PROBLEM — I25.10 CORONARY ARTERY DISEASE INVOLVING NATIVE CORONARY ARTERY OF NATIVE HEART WITHOUT ANGINA PECTORIS: Status: ACTIVE | Noted: 2025-07-15

## 2025-07-18 PROCEDURE — 99212 OFFICE O/P EST SF 10 MIN: CPT | Mod: 95

## 2025-07-18 RX ORDER — SEMAGLUTIDE 0.25 MG/.5ML
0.25 INJECTION, SOLUTION SUBCUTANEOUS
Qty: 1 | Refills: 3 | Status: ACTIVE | COMMUNITY
Start: 2025-07-18 | End: 1900-01-01

## 2025-08-01 ENCOUNTER — TRANSCRIPTION ENCOUNTER (OUTPATIENT)
Age: 55
End: 2025-08-01

## (undated) DEVICE — DRAPE TOWEL BLUE 17" X 24"

## (undated) DEVICE — GLV 8 PROTEXIS (WHITE)

## (undated) DEVICE — DRAPE IRRIGATION POUCH 19X23"

## (undated) DEVICE — ELCTR PLASMA LOOP MEDIUM ANGLED 24FR 12-30 DEG

## (undated) DEVICE — MYOSURE SCOPE SEAL

## (undated) DEVICE — PRESSURE INFUSOR BAG 3000ML

## (undated) DEVICE — TUBING AQUILEX INFLOW

## (undated) DEVICE — MYOSURE SOCK

## (undated) DEVICE — MYOSURE CANISTER AQUILEX KIT

## (undated) DEVICE — PACK D&C

## (undated) DEVICE — SOL IRR BAG NS 0.9% 3000ML

## (undated) DEVICE — TUBING AQUILEX OUTFLOW

## (undated) DEVICE — VENODYNE/SCD SLEEVE CALF MEDIUM

## (undated) DEVICE — DRSG TELFA 3 X 8

## (undated) DEVICE — GOWN TRIMAX XXL

## (undated) DEVICE — WARMING BLANKET UPPER ADULT

## (undated) DEVICE — TUBING TUR 2 PRONG